# Patient Record
Sex: FEMALE | Race: WHITE | NOT HISPANIC OR LATINO | URBAN - NONMETROPOLITAN AREA
[De-identification: names, ages, dates, MRNs, and addresses within clinical notes are randomized per-mention and may not be internally consistent; named-entity substitution may affect disease eponyms.]

---
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
## 2024-01-03 ENCOUNTER — OFFICE VISIT (OUTPATIENT)
Dept: URGENT CARE | Facility: PHYSICIAN GROUP | Age: 24
End: 2024-01-03

## 2024-01-03 VITALS
BODY MASS INDEX: 26.22 KG/M2 | SYSTOLIC BLOOD PRESSURE: 98 MMHG | DIASTOLIC BLOOD PRESSURE: 68 MMHG | WEIGHT: 148 LBS | HEIGHT: 63 IN | TEMPERATURE: 97.9 F | OXYGEN SATURATION: 96 % | HEART RATE: 76 BPM | RESPIRATION RATE: 14 BRPM

## 2024-01-03 DIAGNOSIS — Z02.89 ENCOUNTER FOR PHYSICAL EXAMINATION RELATED TO EMPLOYMENT: ICD-10-CM

## 2024-01-03 PROCEDURE — 8915 PR COMPREHENSIVE PHYSICAL: Performed by: PHYSICIAN ASSISTANT

## 2024-01-04 ENCOUNTER — HOSPITAL ENCOUNTER (OUTPATIENT)
Facility: MEDICAL CENTER | Age: 24
End: 2024-01-04
Attending: NURSE PRACTITIONER
Payer: COMMERCIAL

## 2024-01-04 ENCOUNTER — NON-PROVIDER VISIT (OUTPATIENT)
Dept: URGENT CARE | Facility: PHYSICIAN GROUP | Age: 24
End: 2024-01-04
Payer: COMMERCIAL

## 2024-01-04 DIAGNOSIS — Z11.1 SCREENING FOR TUBERCULOSIS: ICD-10-CM

## 2024-01-04 NOTE — PROGRESS NOTES
Odalis Paz is a 23 y.o. female here for a non-provider visit for TB QUANT    If abnormal was an in office provider notified today (if so, indicate provider)? No    Routed to PCP? No

## 2024-01-04 NOTE — PROGRESS NOTES
Odalis Paz is a 23 y.o. female who presents today for employment physical.  Physical exam is within normal limits.  Patient is capable of doing full duty without restrictions.  Paperwork completed and scanned into her chart.

## 2024-01-05 LAB
GAMMA INTERFERON BACKGROUND BLD IA-ACNC: 0.05 IU/ML
M TB IFN-G BLD-IMP: NEGATIVE
M TB IFN-G CD4+ BCKGRND COR BLD-ACNC: 0 IU/ML
MITOGEN IGNF BCKGRD COR BLD-ACNC: 1.76 IU/ML
QFT TB2 - NIL TBQ2: 0 IU/ML

## 2025-01-10 ENCOUNTER — HOSPITAL ENCOUNTER (OUTPATIENT)
Facility: MEDICAL CENTER | Age: 25
End: 2025-01-10
Attending: NURSE PRACTITIONER

## 2025-01-10 ENCOUNTER — OFFICE VISIT (OUTPATIENT)
Dept: URGENT CARE | Facility: PHYSICIAN GROUP | Age: 25
End: 2025-01-10

## 2025-01-10 VITALS
WEIGHT: 155.1 LBS | OXYGEN SATURATION: 99 % | TEMPERATURE: 98.4 F | HEART RATE: 70 BPM | SYSTOLIC BLOOD PRESSURE: 108 MMHG | DIASTOLIC BLOOD PRESSURE: 68 MMHG | RESPIRATION RATE: 14 BRPM | BODY MASS INDEX: 27.47 KG/M2

## 2025-01-10 DIAGNOSIS — R30.0 DYSURIA: ICD-10-CM

## 2025-01-10 DIAGNOSIS — N89.8 VAGINAL DISCHARGE: ICD-10-CM

## 2025-01-10 DIAGNOSIS — N30.01 ACUTE CYSTITIS WITH HEMATURIA: ICD-10-CM

## 2025-01-10 LAB
APPEARANCE UR: NEGATIVE
BILIRUB UR STRIP-MCNC: 1.02 MG/DL
COLOR UR AUTO: NEGATIVE
GLUCOSE UR STRIP.AUTO-MCNC: NEGATIVE MG/DL
KETONES UR STRIP.AUTO-MCNC: ABNORMAL MG/DL
LEUKOCYTE ESTERASE UR QL STRIP.AUTO: ABNORMAL
NITRITE UR QL STRIP.AUTO: NEGATIVE
PH UR STRIP.AUTO: 0.2 [PH] (ref 5–8)
POCT INT CON NEG: NEGATIVE
POCT INT CON POS: POSITIVE
POCT URINE PREGNANCY TEST: NEGATIVE
PROT UR QL STRIP: ABNORMAL MG/DL
RBC UR QL AUTO: ABNORMAL
SP GR UR STRIP.AUTO: 6
UROBILINOGEN UR STRIP-MCNC: 0.2 MG/DL

## 2025-01-10 PROCEDURE — 87480 CANDIDA DNA DIR PROBE: CPT

## 2025-01-10 PROCEDURE — 81025 URINE PREGNANCY TEST: CPT | Performed by: NURSE PRACTITIONER

## 2025-01-10 PROCEDURE — 3074F SYST BP LT 130 MM HG: CPT | Performed by: NURSE PRACTITIONER

## 2025-01-10 PROCEDURE — 99213 OFFICE O/P EST LOW 20 MIN: CPT | Performed by: NURSE PRACTITIONER

## 2025-01-10 PROCEDURE — 81002 URINALYSIS NONAUTO W/O SCOPE: CPT | Performed by: NURSE PRACTITIONER

## 2025-01-10 PROCEDURE — 87510 GARDNER VAG DNA DIR PROBE: CPT

## 2025-01-10 PROCEDURE — 3078F DIAST BP <80 MM HG: CPT | Performed by: NURSE PRACTITIONER

## 2025-01-10 PROCEDURE — 87660 TRICHOMONAS VAGIN DIR PROBE: CPT

## 2025-01-10 RX ORDER — NITROFURANTOIN 25; 75 MG/1; MG/1
100 CAPSULE ORAL EVERY 12 HOURS
Qty: 10 CAPSULE | Refills: 0 | Status: SHIPPED | OUTPATIENT
Start: 2025-01-10 | End: 2025-01-15

## 2025-01-10 RX ORDER — PHENAZOPYRIDINE HYDROCHLORIDE 200 MG/1
200 TABLET, FILM COATED ORAL 3 TIMES DAILY
Qty: 6 TABLET | Refills: 0 | Status: SHIPPED | OUTPATIENT
Start: 2025-01-10 | End: 2025-01-12

## 2025-01-10 RX ORDER — NORETHINDRONE AND ETHINYL ESTRADIOL 0.4-0.035
1 KIT ORAL
COMMUNITY
Start: 2024-10-22

## 2025-01-10 NOTE — PROGRESS NOTES
Subjective:   Odalis Paz is a 24 y.o. female who presents for Dysuria (X3-4 days Frequency, orange urine, vaginal itching, burning, redness, white discharge  taking AZZO )    Patient is a 24 female presented clinic today reporting 3 to 4-day history of mild burning sensation when urinating, frequency, and urgency with urination.  Patient did take over-the-counter Azo which has helped with symptoms.  She states over the past 2 to 3 days she has had a thicker white discharge.  Patient did to start her menses this morning.  Patient denies any fevers, chills, nausea, hematuria, or flank pain.  Patient is currently sexually active with her long-term partner.  Currently on birth control.    Medications, Allergies, and current problem list reviewed today in Epic.     Objective:     /68   Pulse 70   Temp 36.9 °C (98.4 °F) (Temporal)   Resp 14   Wt 70.4 kg (155 lb 1.6 oz)   SpO2 99%     Physical Exam  Vitals reviewed.   Constitutional:       Appearance: Normal appearance.   HENT:      Head: Normocephalic.      Nose: Nose normal.      Mouth/Throat:      Mouth: Mucous membranes are moist.   Eyes:      Extraocular Movements: Extraocular movements intact.      Conjunctiva/sclera: Conjunctivae normal.      Pupils: Pupils are equal, round, and reactive to light.   Cardiovascular:      Rate and Rhythm: Normal rate.   Pulmonary:      Effort: Pulmonary effort is normal.   Abdominal:      General: Abdomen is flat. There is no distension.      Palpations: Abdomen is soft.      Tenderness: There is no abdominal tenderness. There is no right CVA tenderness, left CVA tenderness, guarding or rebound.   Genitourinary:     Comments: Politely declined  Musculoskeletal:         General: Normal range of motion.      Cervical back: Normal range of motion and neck supple.   Skin:     General: Skin is warm and dry.   Neurological:      Mental Status: She is alert and oriented to person, place, and time.   Psychiatric:         Mood  and Affect: Mood normal.         Behavior: Behavior normal.         Thought Content: Thought content normal.         Judgment: Judgment normal.         Assessment/Plan:     Diagnosis and associated orders:     1. Acute cystitis with hematuria  nitrofurantoin (MACROBID) 100 MG Cap    phenazopyridine (PYRIDIUM) 200 MG Tab      2. Dysuria  POCT Urinalysis    POCT PREGNANCY      3. Vaginal discharge  VAGINAL PATHOGENS DNA PANEL         Comments/MDM:     Patient is a very well appearing 24-year-old patient presenting clinic today with 3 to 4-day history of dysuria symptoms along with some abnormal vaginal discharge.  POCT urinalysis is positive for moderate amount of leukocytes, negative for nitrates, and positive for blood.  Patient is currently been stating.  Urine hCG was negative.  Vaginal pathogen test was collected by patient.  Did discuss and offer vaginal exam however she politely declined.  Patient is not concerned at this time for STDs and is declined further testing.  Will go ahead and place on nitrofurantoin and Pyridium for UTI.  Vaginal pathogen test is pending, will treat accordingly via MyChart.  Recommended increasing fluids, Tylenol, Motrin as needed.   Patient educated on red flags of UTI and encouraged to seek care back in UC or ER for  fever, chills, flank pain, or worsening symptoms.   Patient was involved with shared decision-making throughout the exam today and verbalizes understanding regards to plan of care, discharge instructions, and follow-up         Differential diagnosis, natural history, supportive care, and indications for immediate follow-up discussed.    Advised the patient to follow-up with the primary care physician for recheck, reevaluation, and consideration of further management.    I personally reviewed prior external notes and test results pertinent to today's visit as well as additional imaging and testing completed in clinic today.     Please note that this dictation was  created using voice recognition software. I have made a reasonable attempt to correct obvious errors, but I expect that there are errors of grammar and possibly content that I did not discover before finalizing the note.

## 2025-01-10 NOTE — LETTER
[facility] [address] [place]  [facility] [address]     January 10, 2025    Patient: Odalis Paz   YOB: 2000   Date of Visit: 1/10/2025       To Whom It May Concern:    Odalis Paz was seen and treated in our department on 1/10/2025.     Sincerely,     TIKI Flores.

## 2025-01-11 DIAGNOSIS — B37.31 VAGINAL CANDIDA: ICD-10-CM

## 2025-01-11 LAB
CANDIDA DNA VAG QL PROBE+SIG AMP: POSITIVE
G VAGINALIS DNA VAG QL PROBE+SIG AMP: NEGATIVE
T VAGINALIS DNA VAG QL PROBE+SIG AMP: NEGATIVE

## 2025-01-11 RX ORDER — FLUCONAZOLE 150 MG/1
TABLET ORAL
Qty: 2 TABLET | Refills: 0 | Status: SHIPPED | OUTPATIENT
Start: 2025-01-11